# Patient Record
Sex: FEMALE | NOT HISPANIC OR LATINO | URBAN - METROPOLITAN AREA
[De-identification: names, ages, dates, MRNs, and addresses within clinical notes are randomized per-mention and may not be internally consistent; named-entity substitution may affect disease eponyms.]

---

## 2017-04-27 ENCOUNTER — IMPORTED ENCOUNTER (OUTPATIENT)
Dept: URBAN - METROPOLITAN AREA CLINIC 1 | Facility: CLINIC | Age: 32
End: 2017-04-27

## 2017-04-27 PROBLEM — H40.013: Noted: 2017-04-27

## 2017-04-27 PROBLEM — H52.13: Noted: 2017-04-27

## 2017-04-27 PROCEDURE — S0620 ROUTINE OPHTHALMOLOGICAL EXA: HCPCS

## 2017-04-27 NOTE — PATIENT DISCUSSION
1. Myopia: Rx was given for correction if indicated and requested. 2. COAG suspect OU: (0.60/0.60)  IOP was 14 OU.  -Fm HX. Condition was discussed with patient. Will monitor patient for progression. 3.  Return for an appointment in 1 year for 40. with Dr. Keli Salazar.

## 2018-04-30 ENCOUNTER — IMPORTED ENCOUNTER (OUTPATIENT)
Dept: URBAN - METROPOLITAN AREA CLINIC 1 | Facility: CLINIC | Age: 33
End: 2018-04-30

## 2018-04-30 PROBLEM — H52.13: Noted: 2018-04-30

## 2018-04-30 PROCEDURE — S0621 ROUTINE OPHTHALMOLOGICAL EXA: HCPCS

## 2018-04-30 NOTE — PATIENT DISCUSSION
2. COAG suspect OU: (0.60/0.65)  IOP was 14 OU.  -Fm HX. Condition was discussed with patient. Will monitor patient for progression.

## 2018-04-30 NOTE — PATIENT DISCUSSION
1. Myopia: Rx was given for correction if indicated and requested. 2. COAG suspect OU: (0.60/0.65)  IOP was 14 OU.  -Fm HX. Condition was discussed with patient. Will monitor patient for progression. 3.  Return for an appointment in 1 month for 30 OCT and VF 24-2 with Dr. Zeny Boss.

## 2018-05-30 ENCOUNTER — IMPORTED ENCOUNTER (OUTPATIENT)
Dept: URBAN - METROPOLITAN AREA CLINIC 1 | Facility: CLINIC | Age: 33
End: 2018-05-30

## 2018-05-30 PROBLEM — H40.013: Noted: 2018-05-30

## 2018-05-30 PROCEDURE — 92083 EXTENDED VISUAL FIELD XM: CPT

## 2018-05-30 PROCEDURE — 92014 COMPRE OPH EXAM EST PT 1/>: CPT

## 2018-05-30 PROCEDURE — 92133 CPTRZD OPH DX IMG PST SGM ON: CPT

## 2018-05-30 NOTE — PATIENT DISCUSSION
1.  Glaucoma Suspect OU (CD 0.65/0.70) - OCT and HVF WNL OU. IOP stable 15 OU. -Fm HX. Patient is considered Low Risk. Condition was discussed with patient and patient understands. Will continue to monitor patient for any progression in condition. Patient was advised to call us with any problems questions or concerns. Return for an appointment for Return as scheduled 36 with Dr. Lonnie Melendez.

## 2019-05-02 ENCOUNTER — IMPORTED ENCOUNTER (OUTPATIENT)
Dept: URBAN - METROPOLITAN AREA CLINIC 1 | Facility: CLINIC | Age: 34
End: 2019-05-02

## 2019-05-02 PROBLEM — H52.11: Noted: 2019-05-02

## 2019-05-02 PROCEDURE — S0621 ROUTINE OPHTHALMOLOGICAL EXA: HCPCS

## 2019-05-02 NOTE — PATIENT DISCUSSION
1. Myopia OD -- Finalized Glasses MRx was given to patient today for correction if indicated and requested. 2. Glaucoma Suspect OU (CD: 0.65 OU) -- IOP stable today at 15 OU. Unknown Family h/o Glaucoma. Past w/u Negative & WNL OU. Recommend patient f/u in 6 months under medical insurance for additional w/u & testing. Return for an appointment in 1 YR for a 36 OU with Dr. Zeny Boss. Return for an appointment in 6 MO for a 27 / OCT OU with Dr. Zeny Boss.

## 2019-11-06 ENCOUNTER — IMPORTED ENCOUNTER (OUTPATIENT)
Dept: URBAN - METROPOLITAN AREA CLINIC 1 | Facility: CLINIC | Age: 34
End: 2019-11-06

## 2019-11-06 PROBLEM — H40.013: Noted: 2019-11-06

## 2019-11-06 PROCEDURE — 92133 CPTRZD OPH DX IMG PST SGM ON: CPT

## 2019-11-06 PROCEDURE — 92014 COMPRE OPH EXAM EST PT 1/>: CPT

## 2019-11-06 NOTE — PATIENT DISCUSSION
1.  Glaucoma Suspect OU (CD 0.65/0.70) - OCT WNL OU. IOP stable 10 OU. Negative Fm HX. Patient is considered Low Risk. Condition was discussed with patient and patient understands. Will continue to monitor patient for any progression in condition. Patient was advised to call us with any problems questions or concerns. Return for an appointment in 1 year 27 with Dr. Mikael Mackenzie. Return for an appointment in May 2020 40 with Dr. Mikael Mackenzie.

## 2020-05-13 ENCOUNTER — IMPORTED ENCOUNTER (OUTPATIENT)
Dept: URBAN - METROPOLITAN AREA CLINIC 1 | Facility: CLINIC | Age: 35
End: 2020-05-13

## 2020-05-13 PROBLEM — H52.13: Noted: 2020-05-13

## 2020-05-13 PROCEDURE — S0621 ROUTINE OPHTHALMOLOGICAL EXA: HCPCS

## 2020-05-13 NOTE — PATIENT DISCUSSION
1. Myopia OD -- Finalized Glasses MRx was given to patient today for correction if indicated and requested. 2. Glaucoma Suspect OU (CD: 0.65 OU) -- IOP stable today at 14 OU. Unknown Family h/o Glaucoma. Past w/u Negative & WNL OU. Return for an appointment in 1 YR for a 36 OU with Dr. Cairne Márquez. Return for an appointment in 6 MO for a 30/OCT with Dr. Carine Márquez.

## 2020-12-03 ENCOUNTER — IMPORTED ENCOUNTER (OUTPATIENT)
Dept: URBAN - METROPOLITAN AREA CLINIC 1 | Facility: CLINIC | Age: 35
End: 2020-12-03

## 2020-12-03 PROBLEM — H40.013: Noted: 2020-12-03

## 2020-12-03 PROBLEM — H04.123: Noted: 2020-12-03

## 2020-12-03 PROCEDURE — 92014 COMPRE OPH EXAM EST PT 1/>: CPT

## 2020-12-03 NOTE — PATIENT DISCUSSION
Dry Eyes OU -- Recommended the use of ATs BID OU routinely. Patient deferred MRx today returns with vision plan. Return for an appointment in 1 year 30/OCT with Dr. Miriam Hopper. Return for an appointment as scheduled (40) with Dr. Miriam Hopper.

## 2020-12-03 NOTE — PATIENT DISCUSSION
1.  Glaucoma Suspect OU -- (C/D: 0.65) IOP 13 OU. T-Max 15 OU. (-) Family Hx. Patient is considered Low Risk. Condition was discussed with patient and patient understands. 2.  Dry Eyes OU -- Recommended the use of ATs BID OU routinely. Patient deferred MRx today returns with vision plan. Return for an appointment in 1 year 30/OCT with Dr. Lolly Wesley. Return for an appointment as scheduled (40) with Dr. Lolly Wesley.

## 2021-06-17 ENCOUNTER — IMPORTED ENCOUNTER (OUTPATIENT)
Dept: URBAN - METROPOLITAN AREA CLINIC 1 | Facility: CLINIC | Age: 36
End: 2021-06-17

## 2021-06-17 PROBLEM — H52.13: Noted: 2021-06-17

## 2021-06-17 PROCEDURE — S0621 ROUTINE OPHTHALMOLOGICAL EXA: HCPCS

## 2021-06-17 NOTE — PATIENT DISCUSSION
1. Myopia - Rx was given for correction if indicated and requested. 2. Glaucoma Suspect OU -- (C/D: 0.65) IOP 14 OU. T-Max 15 OU. (-) Family Hx. Patient is considered Low Risk. Condition was discussed with patient and patient understands. 3.  Dry Eyes OU -- Recommended the use of ATs BID OU routinely. (Sample given of Refresh)Return for an appointment in 6 month 30/OCT with Dr. Lolly Wesley.

## 2021-12-17 ENCOUNTER — IMPORTED ENCOUNTER (OUTPATIENT)
Dept: URBAN - METROPOLITAN AREA CLINIC 1 | Facility: CLINIC | Age: 36
End: 2021-12-17

## 2021-12-17 PROBLEM — H04.123: Noted: 2021-12-17

## 2021-12-17 PROBLEM — H40.013: Noted: 2021-12-17

## 2021-12-17 PROBLEM — H16.143: Noted: 2021-12-17

## 2021-12-17 PROCEDURE — 92133 CPTRZD OPH DX IMG PST SGM ON: CPT

## 2021-12-17 PROCEDURE — 99214 OFFICE O/P EST MOD 30 MIN: CPT

## 2021-12-17 NOTE — PATIENT DISCUSSION
1. Glaucoma Suspect OU -- (C/D: 0.65) IOP stable 14 OU. T-Max 15 OU. (-) Family Hx. OCT today WNL OU. Will repeat OCT every other year. Patient is considered Low Risk. Condition was discussed with patient and patient understands. 2.  Dry Eyes OU -- Recommended the use of ATs BID OU routinely. (Sample given of Refresh). 3. Mild Allergic Conjunctivitis OU -- Recommend OTC Pataday Qdaily. Patient defers MRx today- Will return under vision plan as scheduled in June. Return for an appointment in 6 month 30/VF 24-2 with Dr. Trace Stephen.

## 2022-04-02 ASSESSMENT — TONOMETRY
OD_IOP_MMHG: 14
OD_IOP_MMHG: 10
OS_IOP_MMHG: 14
OS_IOP_MMHG: 15
OS_IOP_MMHG: 10
OS_IOP_MMHG: 14
OS_IOP_MMHG: 14
OS_IOP_MMHG: 15
OS_IOP_MMHG: 14
OD_IOP_MMHG: 13
OS_IOP_MMHG: 13
OD_IOP_MMHG: 15
OD_IOP_MMHG: 15
OD_IOP_MMHG: 14
OD_IOP_MMHG: 14
OS_IOP_MMHG: 15
OD_IOP_MMHG: 15
OD_IOP_MMHG: 14

## 2022-04-02 ASSESSMENT — VISUAL ACUITY
OD_CC: 20/25
OS_CC: 20/20
OS_CC: 20/20
OD_CC: 20/20
OS_CC: 20/20
OD_CC: 20/20
OD_SC: 20/20
OS_CC: 20/20
OS_SC: 20/20
OD_CC: 20/20-2
OS_CC: 20/20
OD_CC: 20/20
OS_SC: J1+
OS_CC: 20/20
OD_CC: 20/20
OD_CC: 20/20
OS_CC: 20/20-2
OS_CC: 20/20
OD_SC: J1+
OD_CC: 20/20

## 2022-06-22 ENCOUNTER — ESTABLISHED PATIENT (OUTPATIENT)
Dept: URBAN - METROPOLITAN AREA CLINIC 1 | Facility: CLINIC | Age: 37
End: 2022-06-22

## 2022-06-22 DIAGNOSIS — H52.223: ICD-10-CM

## 2022-06-22 DIAGNOSIS — H52.13: ICD-10-CM

## 2022-06-22 PROCEDURE — 92014 COMPRE OPH EXAM EST PT 1/>: CPT

## 2022-06-22 ASSESSMENT — TONOMETRY
OS_IOP_MMHG: 15
OD_IOP_MMHG: 15

## 2022-06-22 ASSESSMENT — VISUAL ACUITY
OD_SC: 20/20
OS_SC: 20/20

## 2022-12-14 ENCOUNTER — COMPREHENSIVE EXAM (OUTPATIENT)
Dept: URBAN - METROPOLITAN AREA CLINIC 1 | Facility: CLINIC | Age: 37
End: 2022-12-14

## 2022-12-14 PROCEDURE — 92083 EXTENDED VISUAL FIELD XM: CPT

## 2022-12-14 PROCEDURE — 92014 COMPRE OPH EXAM EST PT 1/>: CPT

## 2022-12-14 ASSESSMENT — VISUAL ACUITY
OS_SC: 20/20
OS_SC: J1+
OD_SC: J1+
OD_SC: 20/20

## 2022-12-14 ASSESSMENT — TONOMETRY
OS_IOP_MMHG: 14
OD_IOP_MMHG: 15

## 2022-12-14 NOTE — PATIENT DISCUSSION
(CD: 0.65 OU) IOP Stable. HVF today normal OU. Will test every other year per RBF. Patient is considered low risk. Condition was discussed with patient and patient understands. Will continue to monitor patient for any progression in condition. Patient was advised to call us with any problems, questions, or concerns.

## 2023-08-18 ENCOUNTER — COMPREHENSIVE EXAM (OUTPATIENT)
Dept: URBAN - METROPOLITAN AREA CLINIC 1 | Facility: CLINIC | Age: 38
End: 2023-08-18

## 2023-08-18 DIAGNOSIS — H52.13: ICD-10-CM

## 2023-08-18 PROCEDURE — 92014 COMPRE OPH EXAM EST PT 1/>: CPT

## 2023-08-18 PROCEDURE — 92015 DETERMINE REFRACTIVE STATE: CPT

## 2023-08-18 ASSESSMENT — TONOMETRY
OS_IOP_MMHG: 15
OD_IOP_MMHG: 15

## 2023-08-18 ASSESSMENT — VISUAL ACUITY
OD_SC: 20/25
OS_SC: J1+
OS_SC: 20/25
OD_SC: J1+

## 2024-03-26 ENCOUNTER — COMPREHENSIVE EXAM (OUTPATIENT)
Dept: URBAN - METROPOLITAN AREA CLINIC 1 | Facility: CLINIC | Age: 39
End: 2024-03-26

## 2024-03-26 DIAGNOSIS — H10.45: ICD-10-CM

## 2024-03-26 DIAGNOSIS — H16.143: ICD-10-CM

## 2024-03-26 DIAGNOSIS — H40.013: ICD-10-CM

## 2024-03-26 DIAGNOSIS — H04.123: ICD-10-CM

## 2024-03-26 PROCEDURE — 92014 COMPRE OPH EXAM EST PT 1/>: CPT

## 2024-03-26 PROCEDURE — 92133 CPTRZD OPH DX IMG PST SGM ON: CPT

## 2024-03-26 ASSESSMENT — VISUAL ACUITY
OD_SC: 20/25
OS_SC: 20/20

## 2024-03-26 ASSESSMENT — TONOMETRY
OS_IOP_MMHG: 14
OD_IOP_MMHG: 14